# Patient Record
Sex: MALE | Race: WHITE
[De-identification: names, ages, dates, MRNs, and addresses within clinical notes are randomized per-mention and may not be internally consistent; named-entity substitution may affect disease eponyms.]

---

## 2022-09-09 ENCOUNTER — HOSPITAL ENCOUNTER (EMERGENCY)
Dept: HOSPITAL 46 - ED | Age: 27
Discharge: HOME | End: 2022-09-09
Payer: COMMERCIAL

## 2022-09-09 VITALS — HEIGHT: 69 IN | WEIGHT: 280.01 LBS | BODY MASS INDEX: 41.47 KG/M2

## 2022-09-09 DIAGNOSIS — N20.1: Primary | ICD-10-CM

## 2022-09-09 NOTE — XMS
PreManage Notification: AHSAN RUELAS MRN:[id number]
 
Security Information
 
Security Events
No recent Security Events currently on file
 
 
 
[facility]IA MET
------------
[facility]
 
 
CARE PROVIDERS
There are no care providers on record at this time.
 
Freddy has no Care Guidelines for this patient.
 
CAMERON VISIT COUNT (12 MO.)
-------------------------------------------------------------------------------------
1 DANTE Vilchis
-------------------------------------------------------------------------------------
TOTAL 1
-------------------------------------------------------------------------------------
NOTE: Visits indicate total known visits.
 
ED/C VISIT TRACKING (12 MO.)
-------------------------------------------------------------------------------------
09/09/2022 08:21
[facility] OR
 
TYPE: Emergency
 
COMPLAINT:
- LOWER BACK PAIN
-------------------------------------------------------------------------------------
 
 
INPATIENT VISIT TRACKING (12 MO.)
No inpatient visits to display in this time frame
 
[email or web address]